# Patient Record
Sex: MALE | Race: WHITE | HISPANIC OR LATINO | Employment: FULL TIME | URBAN - METROPOLITAN AREA
[De-identification: names, ages, dates, MRNs, and addresses within clinical notes are randomized per-mention and may not be internally consistent; named-entity substitution may affect disease eponyms.]

---

## 2023-03-30 ENCOUNTER — OFFICE VISIT (OUTPATIENT)
Dept: INTERNAL MEDICINE | Facility: CLINIC | Age: 63
End: 2023-03-30
Payer: COMMERCIAL

## 2023-03-30 VITALS
TEMPERATURE: 97.1 F | WEIGHT: 160.6 LBS | OXYGEN SATURATION: 99 % | DIASTOLIC BLOOD PRESSURE: 78 MMHG | HEART RATE: 67 BPM | SYSTOLIC BLOOD PRESSURE: 132 MMHG

## 2023-03-30 DIAGNOSIS — Z00.00 ROUTINE GENERAL MEDICAL EXAMINATION AT A HEALTH CARE FACILITY: Primary | ICD-10-CM

## 2023-03-30 PROBLEM — I10 ESSENTIAL HYPERTENSION: Status: ACTIVE | Noted: 2023-03-30

## 2023-03-30 PROBLEM — E11.65 TYPE 2 DIABETES MELLITUS WITH HYPERGLYCEMIA, WITHOUT LONG-TERM CURRENT USE OF INSULIN (H): Status: ACTIVE | Noted: 2023-03-30

## 2023-03-30 PROBLEM — E78.5 HYPERLIPIDEMIA LDL GOAL <100: Status: ACTIVE | Noted: 2023-03-30

## 2023-03-30 PROBLEM — N52.9 ERECTILE DYSFUNCTION OF ORGANIC ORIGIN: Status: ACTIVE | Noted: 2023-03-30

## 2023-03-30 PROCEDURE — 99386 PREV VISIT NEW AGE 40-64: CPT | Performed by: INTERNAL MEDICINE

## 2023-03-30 RX ORDER — EXENATIDE 2 MG/.85ML
INJECTION, SUSPENSION, EXTENDED RELEASE SUBCUTANEOUS
COMMUNITY
Start: 2023-03-06

## 2023-03-30 RX ORDER — ATORVASTATIN CALCIUM 20 MG/1
1 TABLET, FILM COATED ORAL DAILY
COMMUNITY
Start: 2023-03-09

## 2023-03-30 RX ORDER — EMPAGLIFLOZIN 25 MG/1
1 TABLET, FILM COATED ORAL DAILY
COMMUNITY
Start: 2022-12-25

## 2023-03-30 RX ORDER — SILDENAFIL 50 MG/1
TABLET, FILM COATED ORAL
COMMUNITY
Start: 2023-02-13

## 2023-03-30 RX ORDER — LISINOPRIL 40 MG/1
1 TABLET ORAL DAILY
COMMUNITY
Start: 2023-02-15

## 2023-03-30 RX ORDER — CHLORTHALIDONE 25 MG/1
1 TABLET ORAL DAILY
COMMUNITY
Start: 2023-02-16

## 2023-03-30 ASSESSMENT — ENCOUNTER SYMPTOMS
FREQUENCY: 1
JOINT SWELLING: 0
HEADACHES: 0
NERVOUS/ANXIOUS: 0
DIARRHEA: 0
ABDOMINAL PAIN: 0
PARESTHESIAS: 0
SHORTNESS OF BREATH: 0
NAUSEA: 0
FEVER: 0
EYE PAIN: 0
CONSTIPATION: 0
DIZZINESS: 0
HEARTBURN: 0
HEMATURIA: 0
SORE THROAT: 0
MYALGIAS: 1
WEAKNESS: 0
PALPITATIONS: 0
CHILLS: 0
HEMATOCHEZIA: 0
COUGH: 0
ARTHRALGIAS: 0
DYSURIA: 0

## 2023-03-30 NOTE — PROGRESS NOTES
SUBJECTIVE:   CC: Duong is an 63 year old who presents for preventative health visit.   Patient has been advised of split billing requirements and indicates understanding: Yes  Healthy Habits:     Getting at least 3 servings of Calcium per day:  NO    Bi-annual eye exam:  NO    Dental care twice a year:  NO    Sleep apnea or symptoms of sleep apnea:  None    Diet:  Regular (no restrictions)    Frequency of exercise:  1 day/week    Duration of exercise:  Greater than 60 minutes    Taking medications regularly:  Yes    Barriers to taking medications:  None    Medication side effects:  None    PHQ-2 Total Score: 0    Additional concerns today:  No    Duong presents today for a physical exam. This is the first time I have met Duong. We utilized a [employer] phone  for this visit. He made this appointment in a same day slot on my schedule. He usually is seen at a different clinic in [place]. He tells me he is here 'to check a box for insurance so I get a discount'. He does not desire to talk about cancer screenings, vaccinations, routine lab work, or his chronic medications/health conditions as he would like his regular clinic to be in charge of that.    Today's PHQ-2 Score:   PHQ-2 ( 1999 [employer]) 3/30/2023   Q1: Little interest or pleasure in doing things 0   Q2: Feeling down, depressed or hopeless 0   PHQ-2 Score 0   Q1: Little interest or pleasure in doing things Not at all   Q2: Feeling down, depressed or hopeless Not at all   PHQ-2 Score 0     Have you ever done Advance Care Planning? (For example, a Health Directive, POLST, or a discussion with a medical provider or your loved ones about your wishes): No, advance care planning information given to patient to review.  Patient plans to discuss their wishes with loved ones or provider.      Social History     Tobacco Use     Smoking status: Never     Smokeless tobacco: Never   Substance Use Topics     Alcohol use: Not on file     Alcohol Use 3/30/2023    Prescreen: >3 drinks/day or >7 drinks/week? No     Reviewed orders with patient. Reviewed health maintenance and updated orders accordingly - Yes    Labs reviewed in EPIC    Reviewed and updated as needed this visit by clinical staff   Tobacco  Allergies  Meds  Problems  Med Hx  Surg Hx  Fam Hx        Reviewed and updated as needed this visit by Provider   Tobacco  Allergies  Meds  Problems  Med Hx  Surg Hx  Fam Hx         Review of Systems   Constitutional: Negative for chills and fever.   HENT: Negative for congestion, ear pain, hearing loss and sore throat.    Eyes: Negative for pain and visual disturbance.   Respiratory: Negative for cough and shortness of breath.    Cardiovascular: Negative for chest pain, palpitations and peripheral edema.   Gastrointestinal: Negative for abdominal pain, constipation, diarrhea, heartburn, hematochezia and nausea.   Genitourinary: Positive for frequency and impotence. Negative for dysuria, genital sores, hematuria, penile discharge and urgency.   Musculoskeletal: Positive for myalgias. Negative for arthralgias and joint swelling.   Skin: Negative for rash.   Neurological: Negative for dizziness, weakness, headaches and paresthesias.   Psychiatric/Behavioral: Negative for mood changes. The patient is not nervous/anxious.      OBJECTIVE:   /78   Pulse 67   Temp 97.1  F (36.2  C) (Oral)   Wt 72.8 kg (160 lb 9.6 oz)   SpO2 99%     Physical Exam  GENERAL: In no distress.  EYES: Conjunctivae/corneas clear. EOMs grossly intact.  HENT: NC/AT, facies symmetric.  RESP: CTAB. No w/r/r.  CV: RRR, no m/r/g.  GI: NT, ND, without rebound or guarding, no CVA tenderness  MSK: Moves all four extremities freely.  SKIN: No significant ulcers, lesions or rashes on the visualized portions of the skin  NEURO: Alert. Oriented.  PSYCH: Linear thought process. Speech normal rate and volume. No tangential thoughts, hallucinations, or delusions.    Diagnostic Test Results: Labs  reviewed in Epic    ASSESSMENT/PLAN:   Routine general medical examination at a health care facility  Performed above exam. Patient declined any preventative healthcare services/discussion as well as all discussion of his chronic health issues as his care is managed by another clinic. I discussed with him at length that it would be best for him to get all of his care at one clinic (be that here or his regular clinic).    COUNSELING:   Reviewed preventive health counseling, as reflected in patient instructions    He reports that he has never smoked. He has never used smokeless tobacco.    Holden Orozco MD  [employer] [facility]

## 2023-06-02 ENCOUNTER — HOSPITAL ENCOUNTER (EMERGENCY)
Facility: CLINIC | Age: 63
Discharge: HOME | End: 2023-06-02
Attending: EMERGENCY MEDICINE | Admitting: EMERGENCY MEDICINE
Payer: COMMERCIAL

## 2023-06-02 ENCOUNTER — OFFICE VISIT (OUTPATIENT)
Dept: URGENT CARE | Facility: URGENT CARE | Age: 63
End: 2023-06-02
Payer: COMMERCIAL

## 2023-06-02 ENCOUNTER — APPOINTMENT (OUTPATIENT)
Dept: CT IMAGING | Facility: CLINIC | Age: 63
End: 2023-06-02
Attending: EMERGENCY MEDICINE
Payer: COMMERCIAL

## 2023-06-02 ENCOUNTER — APPOINTMENT (OUTPATIENT)
Dept: ULTRASOUND IMAGING | Facility: CLINIC | Age: 63
End: 2023-06-02
Attending: EMERGENCY MEDICINE
Payer: COMMERCIAL

## 2023-06-02 VITALS
RESPIRATION RATE: 14 BRPM | TEMPERATURE: 98.3 F | HEART RATE: 74 BPM | OXYGEN SATURATION: 97 % | SYSTOLIC BLOOD PRESSURE: 150 MMHG | DIASTOLIC BLOOD PRESSURE: 78 MMHG

## 2023-06-02 VITALS
RESPIRATION RATE: 20 BRPM | TEMPERATURE: 97.3 F | DIASTOLIC BLOOD PRESSURE: 78 MMHG | OXYGEN SATURATION: 98 % | HEART RATE: 86 BPM | SYSTOLIC BLOOD PRESSURE: 133 MMHG

## 2023-06-02 DIAGNOSIS — I82.811 SAPHENOUS VEIN CLOT, RIGHT: ICD-10-CM

## 2023-06-02 DIAGNOSIS — R22.41 LOCALIZED SWELLING OF RIGHT LOWER EXTREMITY: Primary | ICD-10-CM

## 2023-06-02 LAB
ANION GAP SERPL CALCULATED.3IONS-SCNC: 12 MMOL/L (ref 7–15)
BASOPHILS # BLD AUTO: 0 10E3/UL (ref 0–0.2)
BASOPHILS NFR BLD AUTO: 1 %
BUN SERPL-MCNC: 30.5 MG/DL (ref 8–23)
CALCIUM SERPL-MCNC: 10.1 MG/DL (ref 8.8–10.2)
CHLORIDE SERPL-SCNC: 96 MMOL/L (ref 98–107)
CREAT SERPL-MCNC: 1.21 MG/DL (ref 0.67–1.17)
DEPRECATED HCO3 PLAS-SCNC: 26 MMOL/L (ref 22–29)
EOSINOPHIL # BLD AUTO: 0.2 10E3/UL (ref 0–0.7)
EOSINOPHIL NFR BLD AUTO: 4 %
ERYTHROCYTE [DISTWIDTH] IN BLOOD BY AUTOMATED COUNT: 11.3 % (ref 10–15)
GFR SERPL CREATININE-BSD FRML MDRD: 67 ML/MIN/1.73M2
GLUCOSE SERPL-MCNC: 383 MG/DL (ref 70–99)
HCT VFR BLD AUTO: 38.6 % (ref 40–53)
HGB BLD-MCNC: 13.2 G/DL (ref 13.3–17.7)
IMM GRANULOCYTES # BLD: 0 10E3/UL
IMM GRANULOCYTES NFR BLD: 0 %
LYMPHOCYTES # BLD AUTO: 1.6 10E3/UL (ref 0.8–5.3)
LYMPHOCYTES NFR BLD AUTO: 28 %
MCH RBC QN AUTO: 30.8 PG (ref 26.5–33)
MCHC RBC AUTO-ENTMCNC: 34.2 G/DL (ref 31.5–36.5)
MCV RBC AUTO: 90 FL (ref 78–100)
MONOCYTES # BLD AUTO: 0.6 10E3/UL (ref 0–1.3)
MONOCYTES NFR BLD AUTO: 10 %
NEUTROPHILS # BLD AUTO: 3.2 10E3/UL (ref 1.6–8.3)
NEUTROPHILS NFR BLD AUTO: 57 %
NRBC # BLD AUTO: 0 10E3/UL
NRBC BLD AUTO-RTO: 0 /100
PLATELET # BLD AUTO: 319 10E3/UL (ref 150–450)
POTASSIUM SERPL-SCNC: 5.3 MMOL/L (ref 3.4–5.3)
RBC # BLD AUTO: 4.28 10E6/UL (ref 4.4–5.9)
SODIUM SERPL-SCNC: 134 MMOL/L (ref 136–145)
WBC # BLD AUTO: 5.7 10E3/UL (ref 4–11)

## 2023-06-02 PROCEDURE — 250N000011 HC RX IP 250 OP 636: Performed by: EMERGENCY MEDICINE

## 2023-06-02 PROCEDURE — 85004 AUTOMATED DIFF WBC COUNT: CPT | Performed by: EMERGENCY MEDICINE

## 2023-06-02 PROCEDURE — 99214 OFFICE O/P EST MOD 30 MIN: CPT | Performed by: PHYSICIAN ASSISTANT

## 2023-06-02 PROCEDURE — 99285 EMERGENCY DEPT VISIT HI MDM: CPT | Mod: 25

## 2023-06-02 PROCEDURE — 93971 EXTREMITY STUDY: CPT | Mod: RT

## 2023-06-02 PROCEDURE — 36415 COLL VENOUS BLD VENIPUNCTURE: CPT | Performed by: EMERGENCY MEDICINE

## 2023-06-02 PROCEDURE — 82310 ASSAY OF CALCIUM: CPT | Performed by: EMERGENCY MEDICINE

## 2023-06-02 PROCEDURE — 73701 CT LOWER EXTREMITY W/DYE: CPT | Mod: RT

## 2023-06-02 PROCEDURE — 258N000003 HC RX IP 258 OP 636: Performed by: EMERGENCY MEDICINE

## 2023-06-02 PROCEDURE — 250N000009 HC RX 250: Performed by: EMERGENCY MEDICINE

## 2023-06-02 RX ORDER — SODIUM CHLORIDE 9 MG/ML
INJECTION, SOLUTION INTRAVENOUS CONTINUOUS
Status: DISCONTINUED | OUTPATIENT
Start: 2023-06-02 | End: 2023-06-03 | Stop reason: HOSPADM

## 2023-06-02 RX ORDER — IOPAMIDOL 755 MG/ML
500 INJECTION, SOLUTION INTRAVASCULAR ONCE
Status: COMPLETED | OUTPATIENT
Start: 2023-06-02 | End: 2023-06-02

## 2023-06-02 RX ADMIN — SODIUM CHLORIDE 65 ML: 9 INJECTION, SOLUTION INTRAVENOUS at 21:33

## 2023-06-02 RX ADMIN — IOPAMIDOL 100 ML: 755 INJECTION, SOLUTION INTRAVENOUS at 21:33

## 2023-06-02 RX ADMIN — SODIUM CHLORIDE 1000 ML: 9 INJECTION, SOLUTION INTRAVENOUS at 22:21

## 2023-06-02 ASSESSMENT — ACTIVITIES OF DAILY LIVING (ADL)
ADLS_ACUITY_SCORE: 35
ADLS_ACUITY_SCORE: 35

## 2023-06-02 NOTE — PROGRESS NOTES
.  Assessment & Plan:      Problem List Items Addressed This Visit    None  Visit Diagnoses     Localized swelling of right lower extremity    -  Primary        Medical Decision Making  Patient presents with acute onset swelling, pain, and redness of the right inner thigh over the last week examination shows induration and swelling in a linear pattern going up the right mid thigh concerning for DVT versus superficial thrombophlebitis versus abscess.  Recommend patient present immediately to the emergency room for likely ultrasound and further evaluation and treatment as needed.  Discussed treatment and symptomatic care.  Allergies and medication interactions reviewed.  Discussed signs of worsening symptoms and when to follow-up with PCP if no symptom improvement.     Subjective:      History provided by the patient's daughter who is helping translate.  Duong Lizarraga is a 63 year old male here for evaluation of swelling, erythema, and induration of the right inner thigh.  Onset of symptoms was 8 days ago.  No known injury or trauma to the region.  Patient has been taking high doses of NSAIDs with very minimal improvement.  No history of DVT.  No fevers.     The following portions of the patient's history were reviewed and updated as appropriate: allergies, current medications, and problem list.     Review of Systems  Pertinent items are noted in HPI.    Allergies  No Known Allergies    No family history on file.    Social History     Tobacco Use     Smoking status: Never     Smokeless tobacco: Never   Vaping Use     Vaping status: Never Used   Substance Use Topics     Alcohol use: Not on file        Objective:      BP (!) 150/78   Pulse 74   Temp 98.3  F (36.8  C)   Resp 14   SpO2 97%   General appearance - alert, well appearing, and in no distress and non-toxic  Skin - Right lower extremity: Erythema and increased warmth to touch throughout the right inner thigh from the groin down to the knee, indurated  tissue and palpable mass extending vertically from the mid medial thigh extending proximally into the right groin    The use of Dragon/Palette dictation services was used to construct the content of this note; any grammatical errors are non-intentional. Please contact the author directly if you are in need of any clarification.

## 2023-06-03 NOTE — ED TRIAGE NOTES
dense swelling to right inner thigh. Surrounded with redness. Area is tender to palpation. Started 8 days ago. Patient reports that is appears less swollen today, but he also didn't go to work today which could have contributed to this improvement in symptoms.

## 2023-06-03 NOTE — ED PROVIDER NOTES
History     Chief Complaint:  Wound Check       HPI   Duong Lizarraga is a 63 year old male who presents with redness and dense swelling to his right inner thigh. The patient also endorses some pain with walking. He reports that his symptoms started 8 days ago, but they have improved as of today. No fever. He notes that he did not go to work which could have improved his symptoms. Taking Advil to manage his pain.      Independent Historian:   None - Patient Only    Review of External Notes:   None       Medications:    Lipitor  Hygroton  Jardiance  Zestril  Glucophage  Viagra    Past Medical History:    HTN  HLD  ED  Type 2 diabetes    Past Surgical History:    No past surgical history on file.     Physical Exam     Patient Vitals for the past 24 hrs:   BP Temp Temp src Pulse Resp SpO2   06/02/23 1919 139/77 97.8  F (36.6  C) Temporal 75 16 99 %        Physical Exam   General: Patient is well appearing. No distress.  Head: Atraumatic.  Neck: Normal range of motion. Neck supple.   Cardiovascular: Normal rate, regular rhythm, normal heart sounds and intact distal pulses.   Pulmonary/Chest: Breath sounds normal. No respiratory distress.  Abdominal: Soft. Bowel sounds are normal. No distension. No tenderness. No rebound or guarding.   Musculoskeletal: Normal range of motion.  Skin: Warm and dry. No rash noted. Not diaphoretic. Right inner mid thigh: rectangular in nature area of swelling. Not fluctuant. Not necessarily a sessile mass. No exquisite tenderness. Slight overlying retinues.      Emergency Department Course     Imaging:  CT Femur Thigh Right w Contrast   Final Result   IMPRESSION:   1. Superficial soft tissue stranding in the medial and posterior thigh, likely related to superficial thrombophlebitis of the great saphenous vein as better depicted on recent ultrasound. Cellulitis is a possibility in the appropriate clinical setting.    2. No enhancing soft tissue mass or fluid collection.         US  Lower Extremity Venous Duplex Right   Final Result   IMPRESSION:   1.  No deep venous thrombosis in the right lower extremity.   2.  Occlusive superficial thrombus involving a short segment of the proximal thigh left great saphenous vein as detailed above.      Findings were discussed with Dr. Kimbrough at 9:40 PM on 06/02/2023.         Report per radiology    Laboratory:  Labs Ordered and Resulted from Time of ED Arrival to Time of ED Departure   BASIC METABOLIC PANEL - Abnormal       Result Value    Sodium 134 (*)     Potassium 5.3      Chloride 96 (*)     Carbon Dioxide (CO2) 26      Anion Gap 12      Urea Nitrogen 30.5 (*)     Creatinine 1.21 (*)     Calcium 10.1      Glucose 383 (*)     GFR Estimate 67     CBC WITH PLATELETS AND DIFFERENTIAL - Abnormal    WBC Count 5.7      RBC Count 4.28 (*)     Hemoglobin 13.2 (*)     Hematocrit 38.6 (*)     MCV 90      MCH 30.8      MCHC 34.2      RDW 11.3      Platelet Count 319      % Neutrophils 57      % Lymphocytes 28      % Monocytes 10      % Eosinophils 4      % Basophils 1      % Immature Granulocytes 0      NRBCs per 100 WBC 0      Absolute Neutrophils 3.2      Absolute Lymphocytes 1.6      Absolute Monocytes 0.6      Absolute Eosinophils 0.2      Absolute Basophils 0.0      Absolute Immature Granulocytes 0.0      Absolute NRBCs 0.0            Emergency Department Course & Assessments:      Interventions:  Medications   0.9% sodium chloride BOLUS (1,000 mLs Intravenous $New Bag 6/2/23 2221)     Followed by   sodium chloride 0.9% infusion (has no administration in time range)   iopamidol (ISOVUE-370) solution 500 mL (100 mLs Intravenous $Given 6/2/23 2133)   CT scan flush (65 mLs Intravenous $Given 6/2/23 2133)        Assessments:  2040 I obtained history and examined the patient as noted above    Independent Interpretation (X-rays, CTs, rhythm strip):  Agree with radiology's interpretation    Consultations/Discussion of Management or Tests:  None        Social  Determinants of Health affecting care:   None    Disposition:  The patient was discharged to home.     Impression & Plan      Medical Decision Making:  Duong Lizarraga is a 63 year old male who presents for evaluation of right leg redness and swelling. A broad differential was considered including Baker's cyst rupture, Baker's cyst, hematoma, compartment syndrome, muscle sprain/strain, contusion, fracture, DVT, superficial thrombophlebitis, cellulitis/abscess, amongst others.  Ultrasound was completed here and is superficial thrombus but saphenous and thigh  There are no signs of compartment syndrome or other worrisome etiologies at this time. No fever or signs of cellulitis at this time. He denies any other worrisome symptoms such as chest pain or shortness of breath. He does not have a history of DVT in the past. After evaluation of the ultrasound, a positive proximal right saphenous vein clot was found. This in practice should be anticoagulated and started on pack here for this and rec pcp follow up and given instructions to return .  No chest or breathing sx at this time and not tachycardic or hypoxic.       Diagnosis:    ICD-10-CM    1. Saphenous vein clot, right  I82.811            Discharge Medications:  New Prescriptions    RIVAROXABAN ANTICOAGULANT 15 & 20 MG TBPK STARTER THERAPY PACK    Take 15 mg by mouth 2 times daily (with meals) for 21 days, THEN 20 mg daily with food for 9 days.          Scribe Disclosure:  IThaddeus, am serving as a scribe at 8:41 PM on 6/2/2023 to document services personally performed by Herminio Kimbrough MD based on my observations and the provider's statements to me.   6/2/2023   Herminio Kimbrough MD Stevens, Andrew C, MD  06/03/23 0154

## 2023-08-13 ENCOUNTER — HEALTH MAINTENANCE LETTER (OUTPATIENT)
Age: 63
End: 2023-08-13

## 2023-12-31 ENCOUNTER — HEALTH MAINTENANCE LETTER (OUTPATIENT)
Age: 63
End: 2023-12-31

## 2024-05-19 ENCOUNTER — HEALTH MAINTENANCE LETTER (OUTPATIENT)
Age: 64
End: 2024-05-19

## 2024-10-06 ENCOUNTER — HEALTH MAINTENANCE LETTER (OUTPATIENT)
Age: 64
End: 2024-10-06

## 2025-01-19 ENCOUNTER — HEALTH MAINTENANCE LETTER (OUTPATIENT)
Age: 65
End: 2025-01-19

## 2025-04-27 ENCOUNTER — HEALTH MAINTENANCE LETTER (OUTPATIENT)
Age: 65
End: 2025-04-27

## 2025-06-08 ENCOUNTER — HEALTH MAINTENANCE LETTER (OUTPATIENT)
Age: 65
End: 2025-06-08

## 2025-07-30 ENCOUNTER — OFFICE VISIT (OUTPATIENT)
Dept: URGENT CARE | Facility: URGENT CARE | Age: 65
End: 2025-07-30
Payer: COMMERCIAL

## 2025-07-30 VITALS
OXYGEN SATURATION: 98 % | DIASTOLIC BLOOD PRESSURE: 94 MMHG | WEIGHT: 170 LBS | TEMPERATURE: 97.6 F | SYSTOLIC BLOOD PRESSURE: 190 MMHG | HEART RATE: 70 BPM | RESPIRATION RATE: 16 BRPM

## 2025-07-30 DIAGNOSIS — H60.392 INFECTIVE OTITIS EXTERNA, LEFT: Primary | ICD-10-CM

## 2025-07-30 PROCEDURE — 3080F DIAST BP >= 90 MM HG: CPT | Performed by: FAMILY MEDICINE

## 2025-07-30 PROCEDURE — 3077F SYST BP >= 140 MM HG: CPT | Performed by: FAMILY MEDICINE

## 2025-07-30 PROCEDURE — 99213 OFFICE O/P EST LOW 20 MIN: CPT | Performed by: FAMILY MEDICINE

## 2025-07-30 RX ORDER — OFLOXACIN 3 MG/ML
5 SOLUTION AURICULAR (OTIC) 2 TIMES DAILY
Qty: 5 ML | Refills: 0 | Status: SHIPPED | OUTPATIENT
Start: 2025-07-30 | End: 2025-08-06

## 2025-07-31 NOTE — PROGRESS NOTES
ICD-10-CM    1. Infective otitis externa, left  H60.392 ofloxacin (FLOXIN) 0.3 % otic solution        PLAN:  Patient Instructions   Use ofloxacin drops twice daily for 7 days.    OK to continue over-the-counter lidocaine drops.  Can also use Advil or Tylenol to help with pain.    Return if not improving after 5 days on the drops, sooner if significantly worsening.    SUBJECTIVE:  Duong Lizarraga is a 65 year old male who presents to  with a week of worsening L ear pain.  Thinks that he may have scratched the inside of his ear and that that started the issues.  No fever.  Denies any drainage from the ear.  Tried a dose of Advil today that helped a little bit but not much.  Has been using OTC lidocaine drops.    OBJECTIVE:  BP (!) 190/94   Pulse 70   Temp 97.6  F (36.4  C)   Resp 16   Wt 77.1 kg (170 lb)   SpO2 98%   GEN: well-appearing, in NAD  ENT: R TM and canal WNL, L TM normal, L canal very erythematous and swollen, tender over the L traguus, pain with movement of L pinna

## 2025-07-31 NOTE — PATIENT INSTRUCTIONS
Use ofloxacin drops twice daily for 7 days.    OK to continue over-the-counter lidocaine drops.  Can also use Advil or Tylenol to help with pain.    Return if not improving after 5 days on the drops, sooner if significantly worsening.

## 2025-07-31 NOTE — PROGRESS NOTES
Urgent Care Clinic Visit    Chief Complaint   Patient presents with    Ear Problem     Left ear pain onset 1 week no discharge tx- advil otc drops , lidocaine              7/30/2025     7:47 PM   Additional Questions   Roomed by Cleo   Accompanied by Spouse and daughter

## 2025-08-10 ENCOUNTER — HEALTH MAINTENANCE LETTER (OUTPATIENT)
Age: 65
End: 2025-08-10

## 2025-09-01 ENCOUNTER — HOSPITAL ENCOUNTER (EMERGENCY)
Facility: CLINIC | Age: 65
End: 2025-09-01
Payer: COMMERCIAL

## 2025-09-02 ENCOUNTER — OFFICE VISIT (OUTPATIENT)
Dept: URGENT CARE | Facility: URGENT CARE | Age: 65
End: 2025-09-02
Payer: COMMERCIAL

## 2025-09-02 VITALS
DIASTOLIC BLOOD PRESSURE: 80 MMHG | SYSTOLIC BLOOD PRESSURE: 191 MMHG | TEMPERATURE: 97.9 F | RESPIRATION RATE: 18 BRPM | HEIGHT: 66 IN | WEIGHT: 175 LBS | BODY MASS INDEX: 28.12 KG/M2 | HEART RATE: 64 BPM | OXYGEN SATURATION: 99 %

## 2025-09-02 DIAGNOSIS — H60.391 INFECTIVE OTITIS EXTERNA, RIGHT: Primary | ICD-10-CM

## 2025-09-02 PROCEDURE — 3079F DIAST BP 80-89 MM HG: CPT | Performed by: FAMILY MEDICINE

## 2025-09-02 PROCEDURE — 99213 OFFICE O/P EST LOW 20 MIN: CPT | Performed by: FAMILY MEDICINE

## 2025-09-02 PROCEDURE — 3077F SYST BP >= 140 MM HG: CPT | Performed by: FAMILY MEDICINE
